# Patient Record
Sex: MALE | Race: WHITE | Employment: OTHER | URBAN - METROPOLITAN AREA
[De-identification: names, ages, dates, MRNs, and addresses within clinical notes are randomized per-mention and may not be internally consistent; named-entity substitution may affect disease eponyms.]

---

## 2017-01-20 ENCOUNTER — HOSPITAL ENCOUNTER (OUTPATIENT)
Dept: LAB | Age: 62
Discharge: HOME | End: 2017-01-20

## 2017-01-20 PROCEDURE — 88305 TISSUE EXAM BY PATHOLOGIST: CPT | Performed by: INTERNAL MEDICINE

## 2018-05-14 PROBLEM — R71.8 RBC MICROCYTOSIS: Status: ACTIVE | Noted: 2018-05-14

## 2018-05-14 PROBLEM — E66.01 SEVERE OBESITY (BMI 35.0-39.9) WITH COMORBIDITY (HCC): Status: ACTIVE | Noted: 2018-05-14

## 2018-05-14 PROBLEM — D56.3 THALASSEMIA MINOR: Status: ACTIVE | Noted: 2018-05-14

## 2018-06-28 ENCOUNTER — HOSPITAL ENCOUNTER (OUTPATIENT)
Dept: DIABETES SERVICES | Age: 63
Discharge: HOME | End: 2018-06-28
Attending: FAMILY MEDICINE
Payer: SELF-PAY

## 2018-06-28 VITALS — HEIGHT: 73 IN | BODY MASS INDEX: 36.31 KG/M2 | WEIGHT: 274 LBS

## 2018-06-28 PROCEDURE — G0108 DIAB MANAGE TRN  PER INDIV: HCPCS

## 2018-06-28 NOTE — PROGRESS NOTES
Came for diabetes educational assessment today. While doing flow sheet, when was questions problems or complications, such as breathing, depression, sexual, he did not want to discuss in of that and said' that all has to do with [employer] and he was not going to talk about that. \"  Did explain to patient the rationale of the sexual question is that diabetes will effect erection. Instructed the two issues that get men to come to a physician not seen otherwise is- being tired and erectile dysfunction. Moved on them with education and no depression scale completed due to patient not going to discuss the topic. Provided basic information on carbohydrates, proteins and fats. Educational need/plan: Will attend 2 nutrition/2 diabetes sessions to address the following: diabetes disease process, nutritional management, physical activity, using medications, preventing complications, psychosocial adjustment, goal setting, problem solving, monitoring, behavior change strategies. He agreed to self pay and ten hours of education due to high deductible.

## 2018-07-09 ENCOUNTER — HOSPITAL ENCOUNTER (OUTPATIENT)
Dept: DIABETES SERVICES | Age: 63
Discharge: HOME | End: 2018-07-09
Payer: SELF-PAY

## 2018-07-09 PROCEDURE — G0109 DIAB MANAGE TRN IND/GROUP: HCPCS

## 2018-07-09 NOTE — PROGRESS NOTES
Participant attended Diabetes #1 session today. Topics included: Characteristics/pathophysiology type 1/type 2 diabetes; Goal/acceptable blood glucose ranges/Hgb A1C/interpreting/using results; Using medications safely; Sick day management; Prevention/detection/treatment of acute complications. - Verbalized understanding  of material covered.   -Goal for next session Nutrition One   -Anticipated adherence is good   -Problems/barriers may be none anticipated

## 2018-07-31 ENCOUNTER — HOSPITAL ENCOUNTER (OUTPATIENT)
Dept: DIABETES SERVICES | Age: 63
Discharge: HOME | End: 2018-07-31
Payer: SELF-PAY

## 2018-07-31 PROCEDURE — G0109 DIAB MANAGE TRN IND/GROUP: HCPCS

## 2018-08-09 ENCOUNTER — HOSPITAL ENCOUNTER (OUTPATIENT)
Dept: DIABETES SERVICES | Age: 63
Discharge: HOME | End: 2018-08-09
Payer: SELF-PAY

## 2018-08-09 PROCEDURE — G0109 DIAB MANAGE TRN IND/GROUP: HCPCS

## 2018-08-09 NOTE — PROGRESS NOTES
Attended nutrition diabetes #2 group session today. Topics included: plate method for portion control; fiber and sodium guidelines; sugar substitutes; alcohol; eating out; recipe modification; label reading. Voiced/demonstrated understanding of material covered. Anticipated adherence is good. Pt's goal is to lose weight, he will limit portions at meals when at home by using a 9 inch plate size and slow his eating time down to 20 minutes by putting down his utensil and drinking water between bites. Pt's support plan is to use the plate method handout from class. Problems/barriers: none identified at this time. Recommend check pre-meal blood glucose and 2 hour post-prandial blood glucose to see how food choices affect blood glucose. Plan for follow up is attend diabetes medical #2 class on 8/13/18.

## 2018-08-13 ENCOUNTER — HOSPITAL ENCOUNTER (OUTPATIENT)
Dept: DIABETES SERVICES | Age: 63
Discharge: HOME | End: 2018-08-13
Payer: SELF-PAY

## 2018-08-13 PROCEDURE — G0109 DIAB MANAGE TRN IND/GROUP: HCPCS

## 2018-08-13 NOTE — PROGRESS NOTES
Participant attended Diabetes #2 session today. Topics included: Prevention/detection/treatment of chronic complications; sleep apnea; Developing strategies to promote health/change behavior/recommended screenings; Developing strategies to address psychosocial issues; Goal setting. Participants goal/support plan includes Nutritional Goal:  To lose weight. I will limit portions at meals at home by using 9 inch plate method and slow eating times down to over twenty minutes by putting down utensils down and drink water. Start 8-13-18 and reevaluate 9-13-18,  Medical Goal:  I will discuss with my physician what is the best time to take all of my medications discuss 8-20-18 continue as directed. Plan to attend Diabetes Follow up Class Next year. And use plate method handout. Problems/barriers may be:none anticipated; comments:  Per Patient barrier select the proper food combinations.    Plan for follow up/Recommendations: mail follow up survey in 3 months

## 2018-08-29 ENCOUNTER — HOSPITAL ENCOUNTER (OUTPATIENT)
Dept: DIABETES SERVICES | Age: 63
Discharge: HOME | End: 2018-08-29
Payer: SELF-PAY

## 2018-08-29 PROCEDURE — G0109 DIAB MANAGE TRN IND/GROUP: HCPCS

## 2018-08-29 NOTE — PROGRESS NOTES
Attended diabetes follow up group class. Open forum for clients to ask questions based on entire diabetes self management education program. Education topics are driven in this class based on clients' individual questions and needs. Topics included: carbohydrates, proteins, fats, meal planning, portion control, weight loss, sugar substitutes, eating out, cholesterol, label reading, foot care, continuous glucose monitoring, sleep apnea, glucometer testing, prevention of flu/pneumonia, Hgb A1C and supplies.

## 2018-10-16 PROBLEM — Z23 NEED FOR IMMUNIZATION AGAINST INFLUENZA: Status: ACTIVE | Noted: 2018-10-16

## 2019-09-23 PROBLEM — Z23 NEED FOR IMMUNIZATION AGAINST INFLUENZA: Status: RESOLVED | Noted: 2018-10-16 | Resolved: 2019-09-23

## 2020-05-27 ENCOUNTER — HOSPITAL ENCOUNTER (OUTPATIENT)
Dept: LAB | Age: 65
Discharge: HOME | End: 2020-05-27

## 2020-05-27 PROCEDURE — 88305 TISSUE EXAM BY PATHOLOGIST: CPT

## 2021-08-03 PROBLEM — I10 HTN (HYPERTENSION): Status: RESOLVED | Noted: 2021-08-03 | Resolved: 2021-08-03

## 2022-03-18 PROBLEM — R71.8 RBC MICROCYTOSIS: Status: ACTIVE | Noted: 2018-05-14

## 2022-03-19 PROBLEM — D56.3 THALASSEMIA MINOR: Status: ACTIVE | Noted: 2018-05-14

## 2022-03-20 PROBLEM — E66.01 SEVERE OBESITY (BMI 35.0-39.9) WITH COMORBIDITY (HCC): Status: ACTIVE | Noted: 2018-05-14

## 2022-07-15 NOTE — PROGRESS NOTES
[address] [place] [address] Court, [address] [facility]  [phone]    Patient Name:  Wen Torrez  YOB: 1955      Office Visit 7/18/2022    CHIEF COMPLAINT:    Chief Complaint   Patient presents with    Follow-up    CPAP/BiPAP    Sleep Apnea         HISTORY OF PRESENT ILLNESS:      Patient is seen today for follow-up of sleep apnea. The patient underwent sleep study previously and he has severe sleep apnea with AHI of 72/hour and the oxygen saturation of 62%. He is currently on CPAP at 5-12 cm using a full facemask. He is using and benefiting from his CPAP on a daily basis and his average daily use is 7 hours and 39 minutes. His AHI is 4.6/hour. His pressure requirement is between 11.6 cm and 12 cm. His median airleak is 1.7 L/min and his 95th percentile air leak is 15 L/min. Patient believes sometimes wake him up around 4:00 in the morning. He also feels sleepy sometimes in the daytime and we discussed adding a stimulant but he want to hold on that and continue using CPAP only and hopefully will improve with that. Otherwise we will consider adding mild stimulant like modafinil at 200 mg daily. We will need to update his mask and supply order and I will be done today              Past Medical History:   Diagnosis Date    Cancer ([facility] [address]     skin 2013    Cataract     Central retinal vein occlusion     Chronic anemia     Deviated septum     Diabetes mellitus (HCC)     Eczema     HTN (hypertension)     Mixed hyperlipidemia     Obesity, unspecified     IGNACIO (obstructive sleep apnea)     Retinal venous engorgement     Rosacea     Thalassemia minor          Patient Active Problem List   Diagnosis    Diabetes mellitus ([address]    RBC microcytosis    Essential hypertension with goal blood pressure less than 130/80    Mixed hyperlipidemia    Thalassemia minor    HLD (hyperlipidemia)    Hypoxemia    Obesity    IGNACIO (obstructive sleep apnea)    Severe obesity (BMI 35.0-39. 9) with comorbidity [facility])          Past Surgical History:   Procedure Laterality Date    CATARACT REMOVAL Left     COLONOSCOPY  2011    5 yrs    COLONOSCOPY  05/2020    OTHER SURGICAL HISTORY  02/13/2020    LASER SX ON FACE     SEPTOPLASTY      TONSILLECTOMY AND ADENOIDECTOMY      UVULOPALATOPHARYGOPLASTY         [unfilled]        Social History     Socioeconomic History    Marital status: Single     Spouse name: Not on file    Number of children: Not on file    Years of education: Not on file    Highest education level: Not on file   Occupational History    Not on file   Tobacco Use    Smoking status: Never    Smokeless tobacco: Never   Substance and Sexual Activity    Alcohol use: No    Drug use: No    Sexual activity: Not on file   Other Topics Concern    Not on file   Social History Narrative    Employed as an     During 2 cups of coffee a day.      Social Determinants of Health     Financial Resource Strain: Not on file   Food Insecurity: Not on file   Transportation Needs: Not on file   Physical Activity: Not on file   Stress: Not on file   Social Connections: Not on file   Intimate Partner Violence: Not on file   Housing Stability: Not on file         Family History   Problem Relation Age of Onset    Parkinson's Disease Father     Alzheimer's Disease Father     No Known Problems Brother     Hypertension Mother     Stroke Mother          Allergies   Allergen Reactions    Pravastatin Other (See Comments)         Current Outpatient Medications   Medication Sig    amLODIPine (NORVASC) 5 MG tablet Take 5 mg by mouth daily    aspirin 325 MG tablet Take 325 mg by mouth daily    Astaxanthin 4 MG CAPS Take 8 mg by mouth daily    atorvastatin (LIPITOR) 10 MG tablet Take 10 mg by mouth daily    Cholecalciferol 100 MCG (4000 UT) CAPS Take by mouth    coenzyme Q10 200 MG CAPS capsule Take by mouth daily    Dapagliflozin-metFORMIN HCl ER  MG TB24 TAKE ONE TABLET BY MOUTH ONCE DAILY    glimepiride (AMARYL) 4 MG tablet Take 4 mg by mouth    indapamide (LOZOL) 1.25 MG tablet Take 1.25 mg by mouth daily    lisinopril (PRINIVIL;ZESTRIL) 10 MG tablet TAKE 1 TABLET BY MOUTH ONCE DAILY    metroNIDAZOLE (METROCREAM) 0.75 % cream APPLY TOPICALLY TO AFFECTED AREA TWICE DAILY MORNING AND EVENING    Resveratrol 100 MG CAPS Take by mouth    Semaglutide 7 MG TABS Take 7 mg by mouth every morning (before breakfast)     No current facility-administered medications for this visit. REVIEW OF SYSTEMS:   CONSTITUTIONAL:   There is no history of fever, chills, night sweats, weight loss, weight gain, persistent fatigue, or lethargy/hypersomnolence. CARDIAC:   No chest pain, pressure, discomfort, palpitations, orthopnea, murmurs, or edema. GI:   No dysphagia, heartburn reflux, nausea/vomiting, diarrhea, abdominal pain, or bleeding. NEURO:   There is no history of AMS, persistent headache, decreased level of consciousness, seizures, or motor or sensory deficits. PHYSICAL EXAM:    Vitals:    07/18/22 1300   BP: (!) 148/60   Pulse: 66   Resp: 14   Temp: 96.8 °F (36 °C)   SpO2: 98%        GENERAL APPEARANCE:   The patient is normal weight and in no respiratory distress. HEENT:   PERRL. Conjunctivae unremarkable. Nasal mucosa is without epistaxis, exudate, or polyps. Gums and dentition are unremarkable. There is oropharyngeal narrowing. TMs are clear. NECK/LYMPHATIC:   Symmetrical with no elevation of jugular venous pulsation. Trachea midline. No thyroid enlargement. No cervical adenopathy. LUNGS:   Normal respiratory effort with symmetrical lung expansion. Breath sounds are diminished in the bases. HEART:   There is a regular rate and rhythm. No murmur, rub, or gallop. There is no edema in the lower extremities. ABDOMEN:   Soft and non-tender. No hepatosplenomegaly. Bowel sounds are normal.     NEURO:   The patient is alert and oriented to person, place, and time.   Memory appears intact and mood is Time: min       Patient had a diagnostic Apnea Hypopnea Index (AHI) of : 72/hour  *SUPPLIES* Replace all as needed, or per coverage guidelines     Masks Type:  ( x   ) -Full Face Mask (1 per 3 mon)  (  x  ) -Full Mask (1 per month) Interface/Cushion      (  ) -Nasal Mask (1 per 3 mon)  (  ) - Nasal Mask (1 per month) Interface/Cushion  (     ) -Pillow (2 per mon)  (     ) -Bgwlylsec (1 per 6 mon)            Other Supplies:    (   X  )-Ddpsrfxb (1 per 6 mon)  (   X  )-Tvfkrc Tubing (1 per 3 mon)  (   X  )- Disposable Filter (2 per mon)  (   x  )-Zqunps Humidifier (1 per year)     ( x    )-Wbaaoqjax (sometimes used with Full Face Mask) (1 per 6 mos)  (    )-Tubing-without heat (1 per 3 mos)  (     )-Non-Disposable Filter (1 per 6 mos)  (  x   )-Water Chamber (1 per 6 mos)  (     )-Humidifier non-heated (1 per 5 yrs)      Signed Date: 7/18/2022  Electronically Signed By: Odin Ocasio MD  Electronically Dated:  7/18/2022        No orders of the defined types were placed in this encounter. Over 50% of today's office visit was spent in face to face time reviewing test results, prognosis, importance of compliance, education about disease process, benefits of medications, instructions for management of acute flare-ups, and follow up plans. Total face to face time spent with patient and charting was 30 minutes.         Odin Ocasio MD  Electronically signed

## 2022-07-18 ENCOUNTER — OFFICE VISIT (OUTPATIENT)
Dept: SLEEP MEDICINE | Age: 67
End: 2022-07-18
Payer: MEDICARE

## 2022-07-18 VITALS
HEART RATE: 66 BPM | OXYGEN SATURATION: 98 % | DIASTOLIC BLOOD PRESSURE: 60 MMHG | BODY MASS INDEX: 35.78 KG/M2 | SYSTOLIC BLOOD PRESSURE: 148 MMHG | RESPIRATION RATE: 14 BRPM | TEMPERATURE: 96.8 F | WEIGHT: 270 LBS | HEIGHT: 73 IN

## 2022-07-18 DIAGNOSIS — G47.33 OSA (OBSTRUCTIVE SLEEP APNEA): Primary | ICD-10-CM

## 2022-07-18 DIAGNOSIS — E66.01 SEVERE OBESITY (BMI 35.0-39.9) WITH COMORBIDITY (HCC): ICD-10-CM

## 2022-07-18 DIAGNOSIS — R09.02 HYPOXEMIA: ICD-10-CM

## 2022-07-18 PROCEDURE — 1123F ACP DISCUSS/DSCN MKR DOCD: CPT | Performed by: INTERNAL MEDICINE

## 2022-07-18 PROCEDURE — 1036F TOBACCO NON-USER: CPT | Performed by: INTERNAL MEDICINE

## 2022-07-18 PROCEDURE — 99214 OFFICE O/P EST MOD 30 MIN: CPT | Performed by: INTERNAL MEDICINE

## 2022-07-18 PROCEDURE — 3017F COLORECTAL CA SCREEN DOC REV: CPT | Performed by: INTERNAL MEDICINE

## 2022-07-18 PROCEDURE — G8417 CALC BMI ABV UP PARAM F/U: HCPCS | Performed by: INTERNAL MEDICINE

## 2022-07-18 PROCEDURE — G8427 DOCREV CUR MEDS BY ELIG CLIN: HCPCS | Performed by: INTERNAL MEDICINE

## 2022-07-18 ASSESSMENT — SLEEP AND FATIGUE QUESTIONNAIRES
HOW LIKELY ARE YOU TO NOD OFF OR FALL ASLEEP WHILE WATCHING TV: 1
HOW LIKELY ARE YOU TO NOD OFF OR FALL ASLEEP WHEN YOU ARE A PASSENGER IN A CAR FOR AN HOUR WITHOUT A BREAK: 0
HOW LIKELY ARE YOU TO NOD OFF OR FALL ASLEEP WHILE SITTING INACTIVE IN A PUBLIC PLACE: 0
HOW LIKELY ARE YOU TO NOD OFF OR FALL ASLEEP WHILE SITTING QUIETLY AFTER LUNCH WITHOUT ALCOHOL: 0
HOW LIKELY ARE YOU TO NOD OFF OR FALL ASLEEP IN A CAR, WHILE STOPPED FOR A FEW MINUTES IN TRAFFIC: 0
ESS TOTAL SCORE: 4
HOW LIKELY ARE YOU TO NOD OFF OR FALL ASLEEP WHILE SITTING AND TALKING TO SOMEONE: 0
HOW LIKELY ARE YOU TO NOD OFF OR FALL ASLEEP WHILE LYING DOWN TO REST IN THE AFTERNOON WHEN CIRCUMSTANCES PERMIT: 2
HOW LIKELY ARE YOU TO NOD OFF OR FALL ASLEEP WHILE SITTING AND READING: 1

## 2023-07-18 NOTE — PROGRESS NOTES
[address] [place] [address]  [phone]    Patient Name:  Sean Ellsworth  YOB: 1955      Office Visit 7/20/2023    CHIEF COMPLAINT:      Chief Complaint   Patient presents with    CPAP/BiPAP     Pressure [id number]    Sleep Apnea         HISTORY OF PRESENT ILLNESS:      Patient is seen today for follow-up of sleep apnea. The patient underwent sleep study previously and he has severe sleep apnea with AHI of 72/hour and the oxygen saturation of 62%. He is currently on CPAP at 10-12 cm using a full facemask. He is using and benefiting from his CPAP on a daily basis and his average daily use is 7 hours and 38 minutes. His AHI is much better at 2.4 /hour. His pressure requirement is between 10.7 and 11.9 cm. His median leak is 0.6 L/min and his 95th percentile air leak is 6 L/min. Patient believes sometimes wake him up around 4:00 in the morning. He also feels sleepy sometimes in the daytime may take a nap for up to 1 hour. I think it is reasonable to adjust his pressure slightly higher up to 14 cm and make the range 10-14 cm to correct any residual sleep apnea and hopefully improve his daytime sleepiness. He wants to hold on adding any stimulant at this point. He agreed to adjust his pressure to 10-14 cm and request some instruction about how to navigate his menu on the machine and this will be done for him today.   We will need to update his mask and supply order and I will be done today    Sleep Medicine 7/20/2023 7/18/2022   Sitting and reading 3 1   Watching TV 3 1   Sitting, inactive in a public place (e.g. a theatre or a meeting) 0 0   As a passenger in a car for an hour without a break 0 0   Lying down to rest in the afternoon when circumstances permit 1 2   Sitting and talking to someone 0 0   Sitting quietly after a lunch without alcohol 0 0   In a car, while stopped for a few minutes in traffic 0 0   Roland Sleepiness Score

## 2023-07-20 ENCOUNTER — OFFICE VISIT (OUTPATIENT)
Dept: SLEEP MEDICINE | Age: 68
End: 2023-07-20
Payer: MEDICARE

## 2023-07-20 VITALS
TEMPERATURE: 97.1 F | WEIGHT: 252 LBS | SYSTOLIC BLOOD PRESSURE: 140 MMHG | DIASTOLIC BLOOD PRESSURE: 58 MMHG | OXYGEN SATURATION: 98 % | BODY MASS INDEX: 33.4 KG/M2 | HEART RATE: 62 BPM | RESPIRATION RATE: 16 BRPM | HEIGHT: 73 IN

## 2023-07-20 DIAGNOSIS — R09.02 HYPOXEMIA: ICD-10-CM

## 2023-07-20 DIAGNOSIS — E66.01 SEVERE OBESITY (BMI 35.0-39.9) WITH COMORBIDITY (HCC): ICD-10-CM

## 2023-07-20 DIAGNOSIS — G47.33 OSA (OBSTRUCTIVE SLEEP APNEA): Primary | ICD-10-CM

## 2023-07-20 PROCEDURE — 3078F DIAST BP <80 MM HG: CPT | Performed by: INTERNAL MEDICINE

## 2023-07-20 PROCEDURE — 1123F ACP DISCUSS/DSCN MKR DOCD: CPT | Performed by: INTERNAL MEDICINE

## 2023-07-20 PROCEDURE — 99214 OFFICE O/P EST MOD 30 MIN: CPT | Performed by: INTERNAL MEDICINE

## 2023-07-20 PROCEDURE — 3017F COLORECTAL CA SCREEN DOC REV: CPT | Performed by: INTERNAL MEDICINE

## 2023-07-20 PROCEDURE — G8427 DOCREV CUR MEDS BY ELIG CLIN: HCPCS | Performed by: INTERNAL MEDICINE

## 2023-07-20 PROCEDURE — G8417 CALC BMI ABV UP PARAM F/U: HCPCS | Performed by: INTERNAL MEDICINE

## 2023-07-20 PROCEDURE — 3077F SYST BP >= 140 MM HG: CPT | Performed by: INTERNAL MEDICINE

## 2023-07-20 PROCEDURE — 1036F TOBACCO NON-USER: CPT | Performed by: INTERNAL MEDICINE

## 2023-07-20 ASSESSMENT — SLEEP AND FATIGUE QUESTIONNAIRES
HOW LIKELY ARE YOU TO NOD OFF OR FALL ASLEEP WHEN YOU ARE A PASSENGER IN A CAR FOR AN HOUR WITHOUT A BREAK: 0
HOW LIKELY ARE YOU TO NOD OFF OR FALL ASLEEP IN A CAR, WHILE STOPPED FOR A FEW MINUTES IN TRAFFIC: 0
HOW LIKELY ARE YOU TO NOD OFF OR FALL ASLEEP WHILE SITTING AND READING: 3
HOW LIKELY ARE YOU TO NOD OFF OR FALL ASLEEP WHILE SITTING QUIETLY AFTER LUNCH WITHOUT ALCOHOL: 0
ESS TOTAL SCORE: 7
HOW LIKELY ARE YOU TO NOD OFF OR FALL ASLEEP WHILE SITTING INACTIVE IN A PUBLIC PLACE: 0
HOW LIKELY ARE YOU TO NOD OFF OR FALL ASLEEP WHILE LYING DOWN TO REST IN THE AFTERNOON WHEN CIRCUMSTANCES PERMIT: 1
HOW LIKELY ARE YOU TO NOD OFF OR FALL ASLEEP WHILE SITTING AND TALKING TO SOMEONE: 0
HOW LIKELY ARE YOU TO NOD OFF OR FALL ASLEEP WHILE WATCHING TV: 3

## 2023-08-07 ENCOUNTER — TELEPHONE (OUTPATIENT)
Dept: SLEEP MEDICINE | Age: 68
End: 2023-08-07

## 2023-08-07 NOTE — TELEPHONE ENCOUNTER
Patient is calling because he is waking up with bloated stomach full of air.   Feels like his pressure is too much

## 2023-08-08 ENCOUNTER — TELEPHONE (OUTPATIENT)
Dept: SLEEP MEDICINE | Age: 68
End: 2023-08-08

## 2023-08-08 NOTE — TELEPHONE ENCOUNTER
Patient is calling because he is waking up with bloated stomach full of air. Feels like his pressure is too much.

## 2023-08-09 NOTE — TELEPHONE ENCOUNTER
Per Dr Pablo Coleman order, pressure was to be changed to 8-12cm/h2o. Went into pt's settings on Airview and the pressure had already been changed to 8-10cm/h2o. This change was not done by anyone in this clinic.

## 2024-08-06 NOTE — PROGRESS NOTES
[facility]  [address]  [place] [address]  [phone]    Patient Name:  David Chacon  YOB: 1955      Office Visit 8/7/2024    CHIEF COMPLAINT:      Chief Complaint   Patient presents with    Follow-up    Sleep Apnea    CPAP/BiPAP         HISTORY OF PRESENT ILLNESS:      Patient is seen today for follow-up of sleep apnea.   The patient underwent sleep study previously and he has severe sleep apnea with AHI of 72/hour and the oxygen saturation of 62%.  He is currently on CPAP at  8-9.6 cm using a full facemask.  He is using and benefiting from his CPAP on a daily basis and his average daily use is 7 hours and 39 minutes.  His AHI is much better at 2.8 /hour. His median leak is 0.0 L/min and his 95th percentile air leak is 5.2 L/min.  He indicated he was having a problem with aerophagia and he adjusted the pressure on his own through the clinical menu previously and he wonder if this need to be done further.  He also feels sleepy sometimes in the daytime may take a nap for up to 1 hour.  I think it is reasonable to adjust his pressure to 8-9 cm he wants to hold on adding any stimulant at this point.  He indicated that his diabetes medicine has been adjusted from Mounjaro to Trulicity and currently has been taking Ozempic for the last 6 weeks or so.  He is worried about the cost long-term.  I suggested to him that he can see endocrinologist to help with controlling his diabetes mellitus at least 1 consultation and then he can follow-up with his primary care.  He will let me know if he needs any help from me.  We will need to update his mask and supply order and I will be done today.        8/7/2024     2:20 PM 7/20/2023     1:39 PM 7/18/2022     1:05 PM   Sleep Medicine   Sitting and reading 1 3 1   Watching TV 0 3 1   Sitting, inactive in a public place (e.g. a theatre or a meeting) 0 0 0   As a passenger in a car for an hour without a break 0 0 0   Lying down

## 2024-08-07 ENCOUNTER — OFFICE VISIT (OUTPATIENT)
Dept: SLEEP MEDICINE | Age: 69
End: 2024-08-07
Payer: MEDICARE

## 2024-08-07 VITALS
DIASTOLIC BLOOD PRESSURE: 82 MMHG | WEIGHT: 246 LBS | HEIGHT: 73 IN | RESPIRATION RATE: 14 BRPM | HEART RATE: 84 BPM | SYSTOLIC BLOOD PRESSURE: 106 MMHG | BODY MASS INDEX: 32.6 KG/M2 | OXYGEN SATURATION: 95 %

## 2024-08-07 DIAGNOSIS — R09.02 HYPOXEMIA: ICD-10-CM

## 2024-08-07 DIAGNOSIS — G47.33 OSA (OBSTRUCTIVE SLEEP APNEA): Primary | ICD-10-CM

## 2024-08-07 PROCEDURE — G2211 COMPLEX E/M VISIT ADD ON: HCPCS | Performed by: INTERNAL MEDICINE

## 2024-08-07 PROCEDURE — 99214 OFFICE O/P EST MOD 30 MIN: CPT | Performed by: INTERNAL MEDICINE

## 2024-08-07 PROCEDURE — G8427 DOCREV CUR MEDS BY ELIG CLIN: HCPCS | Performed by: INTERNAL MEDICINE

## 2024-08-07 PROCEDURE — 3074F SYST BP LT 130 MM HG: CPT | Performed by: INTERNAL MEDICINE

## 2024-08-07 PROCEDURE — 1123F ACP DISCUSS/DSCN MKR DOCD: CPT | Performed by: INTERNAL MEDICINE

## 2024-08-07 PROCEDURE — 3017F COLORECTAL CA SCREEN DOC REV: CPT | Performed by: INTERNAL MEDICINE

## 2024-08-07 PROCEDURE — 3079F DIAST BP 80-89 MM HG: CPT | Performed by: INTERNAL MEDICINE

## 2024-08-07 RX ORDER — SEMAGLUTIDE 2.68 MG/ML
INJECTION, SOLUTION SUBCUTANEOUS
COMMUNITY

## 2024-08-07 ASSESSMENT — SLEEP AND FATIGUE QUESTIONNAIRES
HOW LIKELY ARE YOU TO NOD OFF OR FALL ASLEEP WHEN YOU ARE A PASSENGER IN A CAR FOR AN HOUR WITHOUT A BREAK: WOULD NEVER DOZE
HOW LIKELY ARE YOU TO NOD OFF OR FALL ASLEEP WHILE SITTING AND READING: SLIGHT CHANCE OF DOZING
HOW LIKELY ARE YOU TO NOD OFF OR FALL ASLEEP WHILE SITTING AND TALKING TO SOMEONE: WOULD NEVER DOZE
HOW LIKELY ARE YOU TO NOD OFF OR FALL ASLEEP WHILE SITTING INACTIVE IN A PUBLIC PLACE: WOULD NEVER DOZE
HOW LIKELY ARE YOU TO NOD OFF OR FALL ASLEEP IN A CAR, WHILE STOPPED FOR A FEW MINUTES IN TRAFFIC: WOULD NEVER DOZE
ESS TOTAL SCORE: 3
HOW LIKELY ARE YOU TO NOD OFF OR FALL ASLEEP WHILE WATCHING TV: WOULD NEVER DOZE
HOW LIKELY ARE YOU TO NOD OFF OR FALL ASLEEP WHILE LYING DOWN TO REST IN THE AFTERNOON WHEN CIRCUMSTANCES PERMIT: MODERATE CHANCE OF DOZING
HOW LIKELY ARE YOU TO NOD OFF OR FALL ASLEEP WHILE SITTING QUIETLY AFTER LUNCH WITHOUT ALCOHOL: WOULD NEVER DOZE

## 2025-08-06 ENCOUNTER — OFFICE VISIT (OUTPATIENT)
Age: 70
End: 2025-08-06
Payer: MEDICARE

## 2025-08-06 VITALS
HEIGHT: 73 IN | WEIGHT: 259 LBS | RESPIRATION RATE: 14 BRPM | BODY MASS INDEX: 34.33 KG/M2 | DIASTOLIC BLOOD PRESSURE: 80 MMHG | OXYGEN SATURATION: 90 % | HEART RATE: 74 BPM | SYSTOLIC BLOOD PRESSURE: 108 MMHG

## 2025-08-06 DIAGNOSIS — G47.33 OSA (OBSTRUCTIVE SLEEP APNEA): Primary | ICD-10-CM

## 2025-08-06 DIAGNOSIS — R09.02 HYPOXEMIA: ICD-10-CM

## 2025-08-06 PROCEDURE — G8427 DOCREV CUR MEDS BY ELIG CLIN: HCPCS | Performed by: INTERNAL MEDICINE

## 2025-08-06 PROCEDURE — 1123F ACP DISCUSS/DSCN MKR DOCD: CPT | Performed by: INTERNAL MEDICINE

## 2025-08-06 PROCEDURE — G8417 CALC BMI ABV UP PARAM F/U: HCPCS | Performed by: INTERNAL MEDICINE

## 2025-08-06 PROCEDURE — 3079F DIAST BP 80-89 MM HG: CPT | Performed by: INTERNAL MEDICINE

## 2025-08-06 PROCEDURE — G2211 COMPLEX E/M VISIT ADD ON: HCPCS | Performed by: INTERNAL MEDICINE

## 2025-08-06 PROCEDURE — 1159F MED LIST DOCD IN RCRD: CPT | Performed by: INTERNAL MEDICINE

## 2025-08-06 PROCEDURE — 1160F RVW MEDS BY RX/DR IN RCRD: CPT | Performed by: INTERNAL MEDICINE

## 2025-08-06 PROCEDURE — 99214 OFFICE O/P EST MOD 30 MIN: CPT | Performed by: INTERNAL MEDICINE

## 2025-08-06 PROCEDURE — 1036F TOBACCO NON-USER: CPT | Performed by: INTERNAL MEDICINE

## 2025-08-06 PROCEDURE — 3017F COLORECTAL CA SCREEN DOC REV: CPT | Performed by: INTERNAL MEDICINE

## 2025-08-06 PROCEDURE — 3074F SYST BP LT 130 MM HG: CPT | Performed by: INTERNAL MEDICINE

## 2025-08-06 ASSESSMENT — SLEEP AND FATIGUE QUESTIONNAIRES
HOW LIKELY ARE YOU TO NOD OFF OR FALL ASLEEP WHILE SITTING AND READING: SLIGHT CHANCE OF DOZING
HOW LIKELY ARE YOU TO NOD OFF OR FALL ASLEEP WHEN YOU ARE A PASSENGER IN A CAR FOR AN HOUR WITHOUT A BREAK: WOULD NEVER DOZE
HOW LIKELY ARE YOU TO NOD OFF OR FALL ASLEEP IN A CAR, WHILE STOPPED FOR A FEW MINUTES IN TRAFFIC: WOULD NEVER DOZE
HOW LIKELY ARE YOU TO NOD OFF OR FALL ASLEEP WHILE SITTING QUIETLY AFTER LUNCH WITHOUT ALCOHOL: WOULD NEVER DOZE
HOW LIKELY ARE YOU TO NOD OFF OR FALL ASLEEP WHILE SITTING INACTIVE IN A PUBLIC PLACE: WOULD NEVER DOZE
ESS TOTAL SCORE: 3
HOW LIKELY ARE YOU TO NOD OFF OR FALL ASLEEP WHILE LYING DOWN TO REST IN THE AFTERNOON WHEN CIRCUMSTANCES PERMIT: SLIGHT CHANCE OF DOZING
HOW LIKELY ARE YOU TO NOD OFF OR FALL ASLEEP WHILE SITTING AND TALKING TO SOMEONE: WOULD NEVER DOZE
HOW LIKELY ARE YOU TO NOD OFF OR FALL ASLEEP WHILE WATCHING TV: SLIGHT CHANCE OF DOZING